# Patient Record
Sex: MALE | Race: OTHER | NOT HISPANIC OR LATINO | RURAL
[De-identification: names, ages, dates, MRNs, and addresses within clinical notes are randomized per-mention and may not be internally consistent; named-entity substitution may affect disease eponyms.]

---

## 2024-04-06 ENCOUNTER — HOSPITAL ENCOUNTER (EMERGENCY)
Facility: HOSPITAL | Age: 29
Discharge: HOME | End: 2024-04-06
Payer: MEDICAID

## 2024-04-06 VITALS
DIASTOLIC BLOOD PRESSURE: 73 MMHG | HEART RATE: 69 BPM | TEMPERATURE: 98 F | HEIGHT: 73 IN | BODY MASS INDEX: 23.51 KG/M2 | WEIGHT: 177.38 LBS | RESPIRATION RATE: 16 BRPM | SYSTOLIC BLOOD PRESSURE: 119 MMHG | OXYGEN SATURATION: 99 %

## 2024-04-06 DIAGNOSIS — R19.7 DIARRHEA, UNSPECIFIED TYPE: Primary | ICD-10-CM

## 2024-04-06 PROCEDURE — 99283 EMERGENCY DEPT VISIT LOW MDM: CPT | Mod: ,,, | Performed by: NURSE PRACTITIONER

## 2024-04-06 PROCEDURE — 99281 EMR DPT VST MAYX REQ PHY/QHP: CPT

## 2024-04-06 NOTE — Clinical Note
"Rome"Massiel Anton was seen and treated in our emergency department on 4/6/2024.  He may return to work on 04/08/2024.       If you have any questions or concerns, please don't hesitate to call.      Emily Harry, FNP"

## 2024-04-06 NOTE — Clinical Note
"Rome"Massiel Atnon was seen and treated in our emergency department on 4/6/2024.  He may return to work on 04/08/2024.       If you have any questions or concerns, please don't hesitate to call.      Emily Harry, FNP"

## 2024-04-07 NOTE — DISCHARGE INSTRUCTIONS
Follow up with PCP in 2-3 days.   Follow bland diet as listed in handout.   Good handwashing throughout the day.   Increase fluid intake by mouth.   Do not stop diarrhea with over the counter medications unless there are signs of dehydration such as fast heart rate, weakness with position changes, dizziness, dry mouth, decreased urine output or any other worrisome symptoms..   Return to emergency department for any new or worsening symptoms or for any future emergencies.

## 2024-04-07 NOTE — ED PROVIDER NOTES
Encounter Date: 4/6/2024       History     Chief Complaint   Patient presents with    Diarrhea     Patient states he has had diarrhea about 10 times today. Has not taken anything for the diarrhea.  Denies nausea & vomiting.     Rome Anton is a 29 y.o. male presenting to ED with multiple episodes of diarrhea starting around 5 AM today. States that he had to leave work early due to sx but went home and at [employer] and crackers, drank sprite and fell asleep. Notes 5-10 episodes of diarrhea since 5 AM with last episodes being approx 3 hrs PTA. He denies abd pain other than cramping immediately prior to diarrhea episodes and quickly subsides after BM. Currently in NAD. VSS at this time.      The history is provided by the patient.     Review of patient's allergies indicates:  No Known Allergies  Past Medical History:   Diagnosis Date    No pertinent past medical history      Past Surgical History:   Procedure Laterality Date    no surgical history       History reviewed. No pertinent family history.  Social History     Tobacco Use    Smoking status: Never    Smokeless tobacco: Never   Substance Use Topics    Alcohol use: Not Currently    Drug use: Never     Review of Systems   Constitutional:  Negative for appetite change, chills, fatigue and fever.   HENT:  Negative for congestion, postnasal drip, sinus pressure, sinus pain, sore throat and trouble swallowing.    Respiratory:  Negative for cough and shortness of breath.    Cardiovascular:  Negative for chest pain.   Gastrointestinal:  Positive for abdominal pain (cramping associated with diarrhea episodes) and diarrhea. Negative for abdominal distention, blood in stool, nausea and vomiting.   Genitourinary:  Negative for dysuria, frequency and urgency.   Musculoskeletal:  Negative for back pain, gait problem and myalgias.   Neurological:  Negative for dizziness, syncope, weakness, light-headedness and headaches.   Psychiatric/Behavioral:  Negative for confusion.  The patient is not nervous/anxious.    All other systems reviewed and are negative.      Physical Exam     Initial Vitals [04/06/24 1945]   BP Pulse Resp Temp SpO2   119/73 69 16 98 °F (36.7 °C) 99 %      MAP       --         Physical Exam    Nursing note and vitals reviewed.  Constitutional: He appears well-developed and well-nourished. He is not diaphoretic. No distress.   HENT:   Head: Normocephalic.   Right Ear: External ear normal.   Left Ear: External ear normal.   Nose: Nose normal.   Mouth/Throat: Oropharynx is clear and moist. No oropharyngeal exudate.   Eyes: Conjunctivae are normal. Pupils are equal, round, and reactive to light. Right eye exhibits no discharge. Left eye exhibits no discharge. No scleral icterus.   Neck: Neck supple.   Normal range of motion.  Cardiovascular:  Normal rate, regular rhythm and normal heart sounds.           Pulmonary/Chest: Breath sounds normal. No respiratory distress.   Abdominal: Abdomen is soft. Bowel sounds are normal. He exhibits no distension. There is no abdominal tenderness. There is no rebound and no guarding.   Musculoskeletal:         General: Normal range of motion.      Cervical back: Normal range of motion and neck supple.     Lymphadenopathy:     He has no cervical adenopathy.   Neurological: He is alert and oriented to person, place, and time.   Skin: Skin is warm and dry. Capillary refill takes less than 2 seconds.   Psychiatric: He has a normal mood and affect.         Medical Screening Exam   See Full Note    ED Course   Procedures  Labs Reviewed - No data to display       Imaging Results    None          Medications - No data to display  Medical Decision Making  Given the large differential diagnosis for [place] Anton, the decision making in this case is of high complexity.  After evaluating all of the data points in this case, the presentation of [place] Anton is NOT consistent with AAA; Mesenteric Ischemia; Bowel Perforation; Bowel Obstruction; Sigmoid  Volvulus; Diverticulitis; Appendicitis; Peritonitis; Cholecystitis, ascending cholangitis or other gallbladder disease; perforated ulcer; significant GI bleeding, splenic rupture/infarction; Hepatic abscess; GI bleeding, or other surgical/acute abdomen.  Similarly, this presentation is NOT consistent with ACS or Myocardial Ischemia; Pulmonary Embolism; fistula; incarcerated hernia; Pancreatitis, Aortic Dissection; Diabetic Ketoacidosis; Kidney Stone; Ischemic colitis; Psoas or other abscess; Methanol poisoning; Heavy metal toxicity; or porphyria.  Similarly, this case is NOT consistent with testicular torsion, prostatitis, hernia, STI, or other testicular issue.  Similarly, this presentation is NOT consistent with sepsis, pyelonephritis, urinary infection, pneumonia, or other focal bacterial infection.    Strict return and follow-up precautions have been given by me personally to the patient/family/caregiver(s).  Data Reviewed/Counseling: I have reviewed the patient's vital signs, nursing notes, and other relevant tests/information. I had a detailed discussion regarding the historical points, exam findings, and any diagnostic results supporting the discharge diagnosis. I also discussed the need for outpatient follow-up and the need to return to the ED if symptoms worsen or if there are any questions or concerns that arise at home.     Dx: Diarrhea    Amount and/or Complexity of Data Reviewed  Independent Historian:      Details: Rome Anton is a 29 y.o. male presenting to ED with multiple episodes of diarrhea starting around 5 AM today. States that he had to leave work early due to sx but went home and at [employer] and crackers, drank sprite and fell asleep. Notes 5-10 episodes of diarrhea since 5 AM with last episodes being approx 3 hrs PTA. He denies abd pain other than cramping immediately prior to diarrhea episodes and quickly subsides after BM. Currently in NAD. VSS at this time.                                         Clinical Impression:   Final diagnoses:  [R19.7] Diarrhea, unspecified type (Primary)        ED Disposition Condition    Discharge Stable          ED Prescriptions    None       Follow-up Information    None          Emily Harry, DENISEP  04/06/24 2015

## 2024-04-07 NOTE — ED NOTES
Instructed patient to drink clear liquids for 12-24 hours, then begin a bland diet.  May add regular diet back slowly after 24 hours. A bland diet menu is included in discharge instructions. Drink plenty of fluids.  Follow-up with PCP in 2-3 days if not better. Return to ED for any new or worsening symptoms.  Patient verbalized understanding of all instructions.